# Patient Record
Sex: FEMALE | Race: WHITE | NOT HISPANIC OR LATINO | URBAN - METROPOLITAN AREA
[De-identification: names, ages, dates, MRNs, and addresses within clinical notes are randomized per-mention and may not be internally consistent; named-entity substitution may affect disease eponyms.]

---

## 2020-09-02 ENCOUNTER — OFFICE VISIT (OUTPATIENT)
Dept: URBAN - METROPOLITAN AREA CLINIC 92 | Facility: CLINIC | Age: 60
End: 2020-09-02
Payer: COMMERCIAL

## 2020-09-02 DIAGNOSIS — Z87.11 PERSONAL HISTORY OF PEPTIC ULCER DISEASE: ICD-10-CM

## 2020-09-02 DIAGNOSIS — E11.9 TYPE 2 DIABETES MELLITUS WITHOUT COMPLICATION, WITHOUT LONG-TERM CURRENT USE OF INSULIN: ICD-10-CM

## 2020-09-02 DIAGNOSIS — K59.09 CHRONIC CONSTIPATION: ICD-10-CM

## 2020-09-02 DIAGNOSIS — Z80.0 FAMILY HISTORY OF PANCREATIC CANCER: ICD-10-CM

## 2020-09-02 DIAGNOSIS — R63.0 ANOREXIA: ICD-10-CM

## 2020-09-02 DIAGNOSIS — R63.4 WEIGHT LOSS: ICD-10-CM

## 2020-09-02 DIAGNOSIS — R14.0 BLOATING: ICD-10-CM

## 2020-09-02 DIAGNOSIS — K21.9 GERD WITHOUT ESOPHAGITIS: ICD-10-CM

## 2020-09-02 PROCEDURE — G8427 DOCREV CUR MEDS BY ELIG CLIN: HCPCS | Performed by: PHYSICIAN ASSISTANT

## 2020-09-02 PROCEDURE — G8420 CALC BMI NORM PARAMETERS: HCPCS | Performed by: PHYSICIAN ASSISTANT

## 2020-09-02 PROCEDURE — 3017F COLORECTAL CA SCREEN DOC REV: CPT | Performed by: PHYSICIAN ASSISTANT

## 2020-09-02 PROCEDURE — G9906 PT RECV TBCO CESS INTERV: HCPCS | Performed by: PHYSICIAN ASSISTANT

## 2020-09-02 PROCEDURE — 99214 OFFICE O/P EST MOD 30 MIN: CPT | Performed by: PHYSICIAN ASSISTANT

## 2020-09-02 PROCEDURE — G9902 PT SCRN TBCO AND ID AS USER: HCPCS | Performed by: PHYSICIAN ASSISTANT

## 2020-09-02 RX ORDER — GABAPENTIN 300 MG/1
TAKE 1 CAPSULE (300 MG) BY ORAL ROUTE 3 TIMES PER DAY CAPSULE ORAL
Qty: 0 | Refills: 0 | Status: ON HOLD | COMMUNITY
Start: 1900-01-01

## 2020-09-02 RX ORDER — LORATADINE 10 MG
1 PACKET MIXED WITH 8 OUNCES OF FLUID TABLET,DISINTEGRATING ORAL ONCE A DAY
Status: ACTIVE | COMMUNITY

## 2020-09-02 RX ORDER — PLECANATIDE 3 MG/1
1 TABLET TABLET ORAL ONCE A DAY
Qty: 90 | Refills: 3 | OUTPATIENT
Start: 2020-09-02 | End: 2021-08-28

## 2020-09-02 RX ORDER — DEXTROAMPHETAMINE SULFATE 5 MG/1
2 TABLETS IN THE MORNING TABLET ORAL ONCE A DAY
Status: ACTIVE | COMMUNITY

## 2020-09-02 RX ORDER — ALBUTEROL SULFATE 90 UG/1
1 PUFF AS NEEDED AEROSOL, METERED RESPIRATORY (INHALATION)
Status: ACTIVE | COMMUNITY

## 2020-09-02 RX ORDER — GABAPENTIN 800 MG/1
1 TABLET TABLET, FILM COATED ORAL ONCE A DAY
Status: ACTIVE | COMMUNITY

## 2020-09-02 RX ORDER — TRAZODONE HYDROCHLORIDE 150 MG/1
1 TABLET AT BEDTIME TABLET ORAL ONCE A DAY
Status: ACTIVE | COMMUNITY

## 2020-09-02 RX ORDER — BACLOFEN 20 MG/1
1 TABLET WITH FOOD OR MILK TABLET ORAL
Status: ACTIVE | COMMUNITY

## 2020-09-02 RX ORDER — SIMVASTATIN 40 MG/1
TABLET, FILM COATED ORAL
Qty: 0 | Refills: 0 | Status: ON HOLD | COMMUNITY
Start: 1900-01-01

## 2020-09-02 RX ORDER — METFORMIN HYDROCHLORIDE 1000 MG/1
1 TABLET WITH A MEAL TABLET, FILM COATED ORAL ONCE A DAY
Status: ACTIVE | COMMUNITY

## 2020-09-02 RX ORDER — LOVASTATIN 20 MG/1
1 TABLET WITH THE EVENING MEAL TABLET ORAL ONCE A DAY
Status: ACTIVE | COMMUNITY

## 2020-09-02 RX ORDER — MODAFINIL 200 MG/1
1 TABLET IN THE MORNING TABLET ORAL ONCE A DAY
Status: ACTIVE | COMMUNITY

## 2020-09-02 RX ORDER — DAPAGLIFLOZIN 10 MG/1
1 TABLET TABLET, FILM COATED ORAL ONCE A DAY
Status: ACTIVE | COMMUNITY

## 2020-09-02 NOTE — PHYSICAL EXAM GASTROINTESTINAL
Abdomen , soft, mild lower abd discomfort, nondistended , no guarding or rigidity , no masses palpable , normal bowel sounds , no hepatomegaly present

## 2020-09-02 NOTE — HPI-TODAY'S VISIT:
60yoF initially seen in 2012 for a  sour taste in her mouth and regurgitation symptoms and abdominal pain despite PPI's. At that time, 24 hour pH and manometry studies on PPI's were normal. Saw Dr Treviño and s/p fundoplication in Dec 2012. This was successful in resolving her upper symptoms. She has had constipation and bloating for years, worse X months. Thyroid checked with PCP recently, on meds, and WNL. She can go 3-4 days without a BM and then has a large but formed stool. Miralax did not help. Mg helps minimally. Linzess in the past caused diarrhea and amitiza 24 caused cramping. Colonoscopy 2/3/15 which showed diverticulosis, lipoma of transverse colon and a few small sigmoid polyps which were removed and hyperplastic. Repeat Colon 6/6/2019 redundant colon and hemorrhoids o/w normal Father had CRC in his 60s and pancreatic CA in his 70s She notes anorexia and weight loss of 25# in the last year. +T2DM, A1C 7.9.  EGD 9/2/2016 showed gastritis, 2 non-bleeding gastric ulcers, bx without H pylori.  [facility] US normal EGD on 11/17- no ulcers, intact fundoplication, mild chr gastritis (no H pylori)

## 2020-09-02 NOTE — PHYSICAL EXAM CONSTITUTIONAL:
Thin female patient, in no acute distress , ambulating without difficulty , normal communication ability

## 2020-09-04 ENCOUNTER — TELEPHONE ENCOUNTER (OUTPATIENT)
Dept: URBAN - METROPOLITAN AREA CLINIC 92 | Facility: CLINIC | Age: 60
End: 2020-09-04

## 2020-09-11 ENCOUNTER — OFFICE VISIT (OUTPATIENT)
Dept: URBAN - METROPOLITAN AREA TELEHEALTH 2 | Facility: TELEHEALTH | Age: 60
End: 2020-09-11
Payer: COMMERCIAL

## 2020-09-11 DIAGNOSIS — R63.4 WEIGHT LOSS: ICD-10-CM

## 2020-09-11 DIAGNOSIS — K59.09 CHRONIC CONSTIPATION: ICD-10-CM

## 2020-09-11 DIAGNOSIS — K21.9 GERD WITHOUT ESOPHAGITIS: ICD-10-CM

## 2020-09-11 DIAGNOSIS — R14.0 BLOATING: ICD-10-CM

## 2020-09-11 PROCEDURE — 99442 PHONE E/M BY PHYS 11-20 MIN: CPT | Performed by: INTERNAL MEDICINE

## 2020-09-11 PROCEDURE — 99212 OFFICE O/P EST SF 10 MIN: CPT | Performed by: INTERNAL MEDICINE

## 2020-09-11 RX ORDER — DAPAGLIFLOZIN 10 MG/1
1 TABLET TABLET, FILM COATED ORAL ONCE A DAY
Status: ACTIVE | COMMUNITY

## 2020-09-11 RX ORDER — METFORMIN HYDROCHLORIDE 1000 MG/1
1 TABLET WITH A MEAL TABLET, FILM COATED ORAL ONCE A DAY
Status: ACTIVE | COMMUNITY

## 2020-09-11 RX ORDER — MODAFINIL 200 MG/1
1 TABLET IN THE MORNING TABLET ORAL ONCE A DAY
Status: ACTIVE | COMMUNITY

## 2020-09-11 RX ORDER — PLECANATIDE 3 MG/1
1 TABLET TABLET ORAL ONCE A DAY
Qty: 90 | Refills: 3 | Status: ACTIVE | COMMUNITY
Start: 2020-09-02 | End: 2021-08-28

## 2020-09-11 RX ORDER — SIMVASTATIN 40 MG/1
TABLET, FILM COATED ORAL
Qty: 0 | Refills: 0 | COMMUNITY
Start: 1900-01-01

## 2020-09-11 RX ORDER — LUBIPROSTONE 8 UG/1
1 CAPSULE WITH FOOD AND WATER CAPSULE, GELATIN COATED ORAL TWICE A DAY
Qty: 180 CAPSULE | Refills: 3 | OUTPATIENT
Start: 2020-09-11 | End: 2021-09-06

## 2020-09-11 RX ORDER — DEXTROAMPHETAMINE SULFATE 5 MG/1
2 TABLETS IN THE MORNING TABLET ORAL ONCE A DAY
Status: ACTIVE | COMMUNITY

## 2020-09-11 RX ORDER — LOVASTATIN 20 MG/1
1 TABLET WITH THE EVENING MEAL TABLET ORAL ONCE A DAY
Status: ACTIVE | COMMUNITY

## 2020-09-11 RX ORDER — TRAZODONE HYDROCHLORIDE 150 MG/1
1 TABLET AT BEDTIME TABLET ORAL ONCE A DAY
Status: ACTIVE | COMMUNITY

## 2020-09-11 RX ORDER — GABAPENTIN 300 MG/1
TAKE 1 CAPSULE (300 MG) BY ORAL ROUTE 3 TIMES PER DAY CAPSULE ORAL
Qty: 0 | Refills: 0 | COMMUNITY
Start: 1900-01-01

## 2020-09-11 RX ORDER — ALBUTEROL SULFATE 90 UG/1
1 PUFF AS NEEDED AEROSOL, METERED RESPIRATORY (INHALATION)
Status: ACTIVE | COMMUNITY

## 2020-09-11 RX ORDER — BACLOFEN 20 MG/1
1 TABLET WITH FOOD OR MILK TABLET ORAL
Status: ACTIVE | COMMUNITY

## 2020-09-11 RX ORDER — LORATADINE 10 MG
1 PACKET MIXED WITH 8 OUNCES OF FLUID TABLET,DISINTEGRATING ORAL ONCE A DAY
Status: DISCONTINUED | COMMUNITY

## 2020-09-11 RX ORDER — GABAPENTIN 800 MG/1
1 TABLET TABLET, FILM COATED ORAL ONCE A DAY
Status: ACTIVE | COMMUNITY

## 2020-09-11 NOTE — HPI-TODAY'S VISIT:
60yoF initially seen in 2012 for a  sour taste in her mouth and regurgitation symptoms and abdominal pain despite PPI's. At that time, 24 hour pH and manometry studies on PPI's were normal. Saw Dr Treviño and s/p fundoplication in Dec 2012. This was successful in resolving her upper symptoms.    She has had constipation  and bloating for years, worse X months. Thyroid checked with PCP recently, on meds, and WNL. She can go 3-4 days without a BM and then has a large but formed stool. Miralax did not help. Mg helps minimally. Linzess in the past caused diarrhea and amitiza 24 caused cramping. Given trulance last week and had explosive diarrhea with 2 doses so stopped it. Back to constipation.   Colonoscopy 2/3/15 which showed diverticulosis, lipoma of transverse colon and a few small sigmoid polyps which were removed and hyperplastic. Repeat Colon 6/6/2019 redundant colon and hemorrhoids o/w normal Father had CRC in his 60s and pancreatic CA in his 70s  She noted last week anorexia and weight loss of 25# in the last year. +T2DM, A1C 7.9. CT obtained and unrevealing. GES pending next week   EGD 9/2/2016 showed gastritis, 2 non-bleeding gastric ulcers, bx without H pylori.  [facility] US normal EGD on 11/17- no ulcers, intact fundoplication, mild chr gastritis (no H pylori)

## 2020-09-16 ENCOUNTER — LAB OUTSIDE AN ENCOUNTER (OUTPATIENT)
Dept: URBAN - METROPOLITAN AREA CLINIC 96 | Facility: CLINIC | Age: 60
End: 2020-09-16

## 2020-10-23 ENCOUNTER — OFFICE VISIT (OUTPATIENT)
Dept: URBAN - METROPOLITAN AREA TELEHEALTH 2 | Facility: TELEHEALTH | Age: 60
End: 2020-10-23

## 2020-10-23 RX ORDER — MODAFINIL 200 MG/1
1 TABLET IN THE MORNING TABLET ORAL ONCE A DAY
Status: ACTIVE | COMMUNITY

## 2020-10-23 RX ORDER — ALBUTEROL SULFATE 90 UG/1
1 PUFF AS NEEDED AEROSOL, METERED RESPIRATORY (INHALATION)
Status: ACTIVE | COMMUNITY

## 2020-10-23 RX ORDER — DAPAGLIFLOZIN 10 MG/1
1 TABLET TABLET, FILM COATED ORAL ONCE A DAY
Status: ACTIVE | COMMUNITY

## 2020-10-23 RX ORDER — METFORMIN HYDROCHLORIDE 1000 MG/1
1 TABLET WITH A MEAL TABLET, FILM COATED ORAL ONCE A DAY
Status: ACTIVE | COMMUNITY

## 2020-10-23 RX ORDER — DEXTROAMPHETAMINE SULFATE 5 MG/1
2 TABLETS IN THE MORNING TABLET ORAL ONCE A DAY
Status: ACTIVE | COMMUNITY

## 2020-10-23 RX ORDER — TRAZODONE HYDROCHLORIDE 150 MG/1
1 TABLET AT BEDTIME TABLET ORAL ONCE A DAY
Status: ACTIVE | COMMUNITY

## 2020-10-23 RX ORDER — GABAPENTIN 800 MG/1
1 TABLET TABLET, FILM COATED ORAL ONCE A DAY
Status: ACTIVE | COMMUNITY

## 2020-10-23 RX ORDER — LINACLOTIDE 72 UG/1
1 CAPSULE AT LEAST 30 MINUTES BEFORE THE FIRST MEAL OF THE DAY ON AN EMPTY STOMACH CAPSULE, GELATIN COATED ORAL ONCE A DAY
Qty: 30 | OUTPATIENT
Start: 2020-10-23

## 2020-10-23 RX ORDER — BACLOFEN 20 MG/1
1 TABLET WITH FOOD OR MILK TABLET ORAL
Status: ACTIVE | COMMUNITY

## 2020-10-23 RX ORDER — SIMVASTATIN 40 MG/1
TABLET, FILM COATED ORAL
Qty: 0 | Refills: 0 | Status: ACTIVE | COMMUNITY
Start: 1900-01-01

## 2020-10-23 RX ORDER — LOVASTATIN 20 MG/1
1 TABLET WITH THE EVENING MEAL TABLET ORAL ONCE A DAY
Status: ACTIVE | COMMUNITY

## 2020-10-23 RX ORDER — GABAPENTIN 300 MG/1
TAKE 1 CAPSULE (300 MG) BY ORAL ROUTE 3 TIMES PER DAY CAPSULE ORAL
Qty: 0 | Refills: 0 | Status: ACTIVE | COMMUNITY
Start: 1900-01-01

## 2020-10-23 RX ORDER — LUBIPROSTONE 8 UG/1
1 CAPSULE WITH FOOD AND WATER CAPSULE, GELATIN COATED ORAL TWICE A DAY
Qty: 180 CAPSULE | Refills: 3 | Status: ACTIVE | COMMUNITY
Start: 2020-09-11 | End: 2021-09-06

## 2020-10-23 RX ORDER — PLECANATIDE 3 MG/1
1 TABLET TABLET ORAL ONCE A DAY
Qty: 90 | Refills: 3 | Status: ACTIVE | COMMUNITY
Start: 2020-09-02 | End: 2021-08-28

## 2020-10-23 NOTE — HPI-TODAY'S VISIT:
60yoF initially seen in 2012 for a  sour taste in her mouth and regurgitation symptoms and abdominal pain despite PPI's. At that time, 24 hour pH and manometry studies on PPI's were normal. Saw Dr Treviño and s/p fundoplication in Dec 2012. This was successful in resolving her upper symptoms.        She has had constipation and bloating for years, worse X months. Thyroid checked with PCP recently, on meds, and WNL. Normally can go 3-4 days without a BM and then has a large but formed stool. Miralax did not help. Mg helps minimally. Linzess and trulance caused diarrhea and amitiza 24 caused cramping. Rx'd amitiza 8mcg but even on this twice a day has needed additional stool softners and is still constipated. Feels like she does not have the senstation to have a BM.  She had one vaginal labor with an episiotomy. No hematochezia or melena.   Colonoscopy 2/3/15 which showed diverticulosis, lipoma of transverse colon and a few small sigmoid polyps which were removed and hyperplastic. Repeat Colon 6/6/2019 redundant colon and hemorrhoids o/w normal  She has persistent anorexia and weight loss of 25# in the last year. +T2DM, A1C went from 7.9 to 5.4. CT 9/2020 unrevealing. GES WNL this month.   EGD 9/2/2016 showed gastritis, 2 non-bleeding gastric ulcers, bx without H pylori. Repeat 11/2016 without ulcers, intact fundoplication Father had CRC in his 60s and pancreatic CA in his 70s

## 2020-11-11 ENCOUNTER — WEB ENCOUNTER (OUTPATIENT)
Dept: URBAN - METROPOLITAN AREA CLINIC 92 | Facility: CLINIC | Age: 60
End: 2020-11-11

## 2020-11-11 RX ORDER — LINACLOTIDE 290 UG/1
1 CAPSULE AT LEAST 30 MINUTES BEFORE THE FIRST MEAL OF THE DAY ON AN EMPTY STOMACH CAPSULE, GELATIN COATED ORAL ONCE A DAY
Qty: 30 | OUTPATIENT
Start: 2020-11-12 | End: 2020-12-12

## 2020-11-19 ENCOUNTER — OFFICE VISIT (OUTPATIENT)
Dept: URBAN - METROPOLITAN AREA MEDICAL CENTER 28 | Facility: MEDICAL CENTER | Age: 60
End: 2020-11-19
Payer: COMMERCIAL

## 2020-11-19 DIAGNOSIS — K59.09 CHRONIC CONSTIPATION: ICD-10-CM

## 2020-11-19 PROCEDURE — 91122 ANAL PRESSURE RECORD: CPT | Performed by: INTERNAL MEDICINE

## 2020-11-19 PROCEDURE — 91120 RECTAL SENSATION TEST: CPT | Performed by: INTERNAL MEDICINE

## 2020-12-07 ENCOUNTER — DASHBOARD ENCOUNTERS (OUTPATIENT)
Age: 60
End: 2020-12-07

## 2020-12-08 ENCOUNTER — OFFICE VISIT (OUTPATIENT)
Dept: URBAN - METROPOLITAN AREA TELEHEALTH 2 | Facility: TELEHEALTH | Age: 60
End: 2020-12-08
Payer: COMMERCIAL

## 2020-12-08 DIAGNOSIS — K21.9 GERD WITHOUT ESOPHAGITIS: ICD-10-CM

## 2020-12-08 DIAGNOSIS — R63.4 WEIGHT LOSS: ICD-10-CM

## 2020-12-08 DIAGNOSIS — K59.09 CHRONIC CONSTIPATION: ICD-10-CM

## 2020-12-08 DIAGNOSIS — Z87.11 PERSONAL HISTORY OF PEPTIC ULCER DISEASE: ICD-10-CM

## 2020-12-08 PROBLEM — 313436004: Status: ACTIVE | Noted: 2020-09-02

## 2020-12-08 PROBLEM — 429000004: Status: ACTIVE | Noted: 2020-09-02

## 2020-12-08 PROBLEM — 89362005: Status: ACTIVE | Noted: 2020-09-02

## 2020-12-08 PROBLEM — 266998003: Status: ACTIVE | Noted: 2020-09-01

## 2020-12-08 PROBLEM — 116289008: Status: ACTIVE | Noted: 2020-09-02

## 2020-12-08 PROBLEM — 236069009: Status: ACTIVE | Noted: 2020-09-01

## 2020-12-08 PROBLEM — 79890006: Status: ACTIVE | Noted: 2020-09-02

## 2020-12-08 PROBLEM — 266435005: Status: ACTIVE | Noted: 2020-09-01

## 2020-12-08 PROCEDURE — 3017F COLORECTAL CA SCREEN DOC REV: CPT | Performed by: INTERNAL MEDICINE

## 2020-12-08 PROCEDURE — G9903 PT SCRN TBCO ID AS NON USER: HCPCS | Performed by: INTERNAL MEDICINE

## 2020-12-08 PROCEDURE — G8482 FLU IMMUNIZE ORDER/ADMIN: HCPCS | Performed by: INTERNAL MEDICINE

## 2020-12-08 PROCEDURE — G8427 DOCREV CUR MEDS BY ELIG CLIN: HCPCS | Performed by: INTERNAL MEDICINE

## 2020-12-08 PROCEDURE — G8420 CALC BMI NORM PARAMETERS: HCPCS | Performed by: INTERNAL MEDICINE

## 2020-12-08 PROCEDURE — 99213 OFFICE O/P EST LOW 20 MIN: CPT | Performed by: INTERNAL MEDICINE

## 2020-12-08 PROCEDURE — 1036F TOBACCO NON-USER: CPT | Performed by: INTERNAL MEDICINE

## 2020-12-08 RX ORDER — GABAPENTIN 800 MG/1
1 TABLET TABLET, FILM COATED ORAL ONCE A DAY
Status: ACTIVE | COMMUNITY

## 2020-12-08 RX ORDER — BACLOFEN 20 MG/1
1 TABLET WITH FOOD OR MILK TABLET ORAL
Status: ACTIVE | COMMUNITY

## 2020-12-08 RX ORDER — TRAZODONE HYDROCHLORIDE 150 MG/1
1 TABLET AT BEDTIME TABLET ORAL ONCE A DAY
Status: ACTIVE | COMMUNITY

## 2020-12-08 RX ORDER — PLECANATIDE 3 MG/1
1 TABLET TABLET ORAL ONCE A DAY
Qty: 90 | Refills: 3 | Status: DISCONTINUED | COMMUNITY
Start: 2020-09-02 | End: 2021-08-28

## 2020-12-08 RX ORDER — LOVASTATIN 20 MG/1
1 TABLET WITH THE EVENING MEAL TABLET ORAL ONCE A DAY
Status: ACTIVE | COMMUNITY

## 2020-12-08 RX ORDER — LINACLOTIDE 72 UG/1
1 CAPSULE AT LEAST 30 MINUTES BEFORE THE FIRST MEAL OF THE DAY ON AN EMPTY STOMACH CAPSULE, GELATIN COATED ORAL ONCE A DAY
Qty: 30 | Status: DISCONTINUED | COMMUNITY
Start: 2020-10-23

## 2020-12-08 RX ORDER — METFORMIN HYDROCHLORIDE 1000 MG/1
1 TABLET WITH A MEAL TABLET, FILM COATED ORAL ONCE A DAY
Status: ACTIVE | COMMUNITY

## 2020-12-08 RX ORDER — LINACLOTIDE 290 UG/1
1 CAPSULE AT LEAST 30 MINUTES BEFORE THE FIRST MEAL OF THE DAY ON AN EMPTY STOMACH CAPSULE, GELATIN COATED ORAL ONCE A DAY
Qty: 30 | Status: ACTIVE | COMMUNITY
Start: 2020-11-12 | End: 2020-12-12

## 2020-12-08 RX ORDER — DEXTROAMPHETAMINE SULFATE 5 MG/1
2 TABLETS IN THE MORNING TABLET ORAL ONCE A DAY
Status: ACTIVE | COMMUNITY

## 2020-12-08 RX ORDER — LUBIPROSTONE 8 UG/1
1 CAPSULE WITH FOOD AND WATER CAPSULE, GELATIN COATED ORAL TWICE A DAY
Qty: 180 CAPSULE | Refills: 3 | Status: DISCONTINUED | COMMUNITY
Start: 2020-09-11 | End: 2021-09-06

## 2020-12-08 RX ORDER — ALBUTEROL SULFATE 90 UG/1
1 PUFF AS NEEDED AEROSOL, METERED RESPIRATORY (INHALATION)
Status: ACTIVE | COMMUNITY

## 2020-12-08 RX ORDER — SIMVASTATIN 40 MG/1
TABLET, FILM COATED ORAL
Qty: 0 | Refills: 0 | Status: ACTIVE | COMMUNITY
Start: 1900-01-01

## 2020-12-08 RX ORDER — MODAFINIL 200 MG/1
1 TABLET IN THE MORNING TABLET ORAL ONCE A DAY
Status: ACTIVE | COMMUNITY

## 2020-12-08 RX ORDER — DAPAGLIFLOZIN 10 MG/1
1 TABLET TABLET, FILM COATED ORAL ONCE A DAY
Status: ACTIVE | COMMUNITY

## 2020-12-08 RX ORDER — GABAPENTIN 300 MG/1
TAKE 1 CAPSULE (300 MG) BY ORAL ROUTE 3 TIMES PER DAY CAPSULE ORAL
Qty: 0 | Refills: 0 | Status: ACTIVE | COMMUNITY
Start: 1900-01-01

## 2020-12-08 NOTE — HPI-TODAY'S VISIT:
60yoF initially seen in 2012 for a  sour taste in her mouth and regurgitation symptoms and abdominal pain despite PPI's. At that time, 24 hour pH and manometry studies on PPI's were normal. Saw Dr Treviño and s/p fundoplication in Dec 2012. This was successful in resolving her upper symptoms.          She has had constipation and bloating for years, worse X months. Thyroid checked with PCP recently, on meds, and WNL. Normally can go 3-4 days without a BM and then has a large but formed stool. Miralax did not help. Mg helps minimally.  trulance caused diarrhea and amitiza 24 caused cramping and did not improve sx. Linzess 72 and 290 did not help. Still with constiption and bloating but no GIB She had one vaginal labor with an episiotomy. No hematochezia or melena. ARM + Paradoxical pushes, low 1st sensation, low urge, low max tolerable volume (abn rectal sensations) c/w pelvic floor d/f  Colonoscopy 2/3/15 which showed diverticulosis, lipoma of transverse colon and a few small sigmoid polyps which were removed and hyperplastic. Repeat Colon 6/6/2019 redundant colon and hemorrhoids o/w normal  She has persistent anorexia and weight loss of 25# in the last year but this is stable over the last few months +T2DM, A1C went from 7.9 to 5.4. CT 9/2020 unrevealing. GES WNL this month.   EGD 9/2/2016 showed gastritis, 2 non-bleeding gastric ulcers, bx without H pylori. Repeat 11/2016 without ulcers, intact fundoplication Father had CRC in his 60s and pancreatic CA in his 70s

## 2020-12-14 ENCOUNTER — WEB ENCOUNTER (OUTPATIENT)
Dept: URBAN - METROPOLITAN AREA CLINIC 92 | Facility: CLINIC | Age: 60
End: 2020-12-14

## 2020-12-18 ENCOUNTER — TELEPHONE ENCOUNTER (OUTPATIENT)
Dept: URBAN - METROPOLITAN AREA CLINIC 92 | Facility: CLINIC | Age: 60
End: 2020-12-18